# Patient Record
Sex: MALE | Race: WHITE
[De-identification: names, ages, dates, MRNs, and addresses within clinical notes are randomized per-mention and may not be internally consistent; named-entity substitution may affect disease eponyms.]

---

## 2017-03-04 ENCOUNTER — HOSPITAL ENCOUNTER (EMERGENCY)
Dept: HOSPITAL 84 - D.ER | Age: 27
Discharge: HOME | End: 2017-03-04
Payer: MEDICAID

## 2017-03-04 VITALS — BODY MASS INDEX: 23.7 KG/M2

## 2017-03-04 DIAGNOSIS — S50.11XA: Primary | ICD-10-CM
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
## 2017-03-04 DIAGNOSIS — F17.200: ICD-10-CM
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
## 2017-06-12 ENCOUNTER — HOSPITAL ENCOUNTER (EMERGENCY)
Dept: HOSPITAL 84 - D.ER | Age: 27
Discharge: HOME | End: 2017-06-12
Payer: MEDICAID

## 2017-06-12 VITALS — BODY MASS INDEX: 23.7 KG/M2

## 2017-06-12 DIAGNOSIS — K08.89: Primary | ICD-10-CM

## 2017-06-29 ENCOUNTER — HOSPITAL ENCOUNTER (EMERGENCY)
Dept: HOSPITAL 84 - D.ER | Age: 27
Discharge: HOME | End: 2017-06-29
Payer: MEDICAID

## 2017-06-29 VITALS — BODY MASS INDEX: 23.7 KG/M2

## 2017-06-29 DIAGNOSIS — L02.31: Primary | ICD-10-CM

## 2017-06-29 DIAGNOSIS — F17.200: ICD-10-CM

## 2017-10-25 ENCOUNTER — HOSPITAL ENCOUNTER (EMERGENCY)
Dept: HOSPITAL 84 - D.ER | Age: 27
Discharge: HOME | End: 2017-10-25
Payer: MEDICAID

## 2017-10-25 VITALS — BODY MASS INDEX: 23.7 KG/M2

## 2017-10-25 DIAGNOSIS — L03.115: Primary | ICD-10-CM

## 2017-10-25 DIAGNOSIS — F17.200: ICD-10-CM

## 2018-08-26 ENCOUNTER — HOSPITAL ENCOUNTER (EMERGENCY)
Dept: HOSPITAL 84 - D.ER | Age: 28
Discharge: HOME | End: 2018-08-26
Payer: MEDICAID

## 2018-08-26 VITALS — SYSTOLIC BLOOD PRESSURE: 151 MMHG | DIASTOLIC BLOOD PRESSURE: 87 MMHG

## 2018-08-26 VITALS — WEIGHT: 165.35 LBS | BODY MASS INDEX: 23.67 KG/M2 | HEIGHT: 70 IN

## 2018-08-26 DIAGNOSIS — F17.200: ICD-10-CM

## 2018-08-26 DIAGNOSIS — K04.7: Primary | ICD-10-CM

## 2020-08-17 ENCOUNTER — HOSPITAL ENCOUNTER (INPATIENT)
Dept: HOSPITAL 84 - D.ER | Age: 30
LOS: 2 days | Discharge: HOME | DRG: 390 | End: 2020-08-19
Attending: FAMILY MEDICINE | Admitting: FAMILY MEDICINE
Payer: MEDICAID

## 2020-08-17 VITALS
BODY MASS INDEX: 28.31 KG/M2 | HEIGHT: 67 IN | BODY MASS INDEX: 28.31 KG/M2 | BODY MASS INDEX: 28.31 KG/M2 | WEIGHT: 180.38 LBS | WEIGHT: 180.38 LBS | HEIGHT: 67 IN

## 2020-08-17 DIAGNOSIS — R10.9: ICD-10-CM

## 2020-08-17 DIAGNOSIS — K59.00: ICD-10-CM

## 2020-08-17 DIAGNOSIS — K56.609: Primary | ICD-10-CM

## 2020-08-17 DIAGNOSIS — F17.200: ICD-10-CM

## 2020-08-17 LAB
ALBUMIN SERPL-MCNC: 3.5 G/DL (ref 3.4–5)
ALP SERPL-CCNC: 82 U/L (ref 30–120)
ALT SERPL-CCNC: 21 U/L (ref 10–68)
AMYLASE SERPL-CCNC: 52 U/L (ref 25–115)
ANION GAP SERPL CALC-SCNC: 10.7 MMOL/L (ref 8–16)
BASOPHILS NFR BLD AUTO: 0.2 % (ref 0–2)
BILIRUB SERPL-MCNC: 0.26 MG/DL (ref 0.2–1.3)
BILIRUB SERPL-MCNC: NEGATIVE MG/DL
BUN SERPL-MCNC: 16 MG/DL (ref 7–18)
CALCIUM SERPL-MCNC: 8.4 MG/DL (ref 8.5–10.1)
CHLORIDE SERPL-SCNC: 101 MMOL/L (ref 98–107)
CO2 SERPL-SCNC: 29.3 MMOL/L (ref 21–32)
CREAT SERPL-MCNC: 1 MG/DL (ref 0.6–1.3)
EOSINOPHIL NFR BLD: 1.5 % (ref 0–7)
ERYTHROCYTE [DISTWIDTH] IN BLOOD BY AUTOMATED COUNT: 12.9 % (ref 11.5–14.5)
GLOBULIN SER-MCNC: 3.9 G/L
GLUCOSE SERPL-MCNC: 131 MG/DL (ref 74–106)
GLUCOSE SERPL-MCNC: NEGATIVE MG/DL
HCT VFR BLD CALC: 42.6 % (ref 42–54)
HGB BLD-MCNC: 15 G/DL (ref 13.5–17.5)
IMM GRANULOCYTES NFR BLD: 0.2 % (ref 0–5)
KETONES UR STRIP-MCNC: NEGATIVE MG/DL
LIPASE SERPL-CCNC: 73 U/L (ref 73–393)
LYMPHOCYTES NFR BLD AUTO: 16.1 % (ref 15–50)
MCH RBC QN AUTO: 30.3 PG (ref 26–34)
MCHC RBC AUTO-ENTMCNC: 35.2 G/DL (ref 31–37)
MCV RBC: 86.1 FL (ref 80–100)
MONOCYTES NFR BLD: 6.7 % (ref 2–11)
NEUTROPHILS NFR BLD AUTO: 75.3 % (ref 40–80)
NITRITE UR-MCNC: NEGATIVE MG/ML
OSMOLALITY SERPL CALC.SUM OF ELEC: 276 MOSM/KG (ref 275–300)
PH UR STRIP: 7 [PH] (ref 5–6)
PLATELET # BLD: 255 10X3/UL (ref 130–400)
PMV BLD AUTO: 9.2 FL (ref 7.4–10.4)
POTASSIUM SERPL-SCNC: 4 MMOL/L (ref 3.5–5.1)
PROT SERPL-MCNC: 7.4 G/DL (ref 6.4–8.2)
RBC # BLD AUTO: 4.95 10X6/UL (ref 4.2–6.1)
SODIUM SERPL-SCNC: 137 MMOL/L (ref 136–145)
UROBILINOGEN UR-MCNC: NORMAL MG/DL
WBC # BLD AUTO: 9.2 10X3/UL (ref 4.8–10.8)

## 2020-08-18 VITALS — DIASTOLIC BLOOD PRESSURE: 81 MMHG | SYSTOLIC BLOOD PRESSURE: 132 MMHG

## 2020-08-18 VITALS — DIASTOLIC BLOOD PRESSURE: 67 MMHG | SYSTOLIC BLOOD PRESSURE: 122 MMHG

## 2020-08-18 VITALS — DIASTOLIC BLOOD PRESSURE: 68 MMHG | SYSTOLIC BLOOD PRESSURE: 118 MMHG

## 2020-08-18 VITALS — SYSTOLIC BLOOD PRESSURE: 119 MMHG | DIASTOLIC BLOOD PRESSURE: 72 MMHG

## 2020-08-18 VITALS — SYSTOLIC BLOOD PRESSURE: 113 MMHG | DIASTOLIC BLOOD PRESSURE: 75 MMHG

## 2020-08-18 VITALS — DIASTOLIC BLOOD PRESSURE: 72 MMHG | SYSTOLIC BLOOD PRESSURE: 115 MMHG

## 2020-08-18 VITALS — DIASTOLIC BLOOD PRESSURE: 67 MMHG | SYSTOLIC BLOOD PRESSURE: 139 MMHG

## 2020-08-18 VITALS — DIASTOLIC BLOOD PRESSURE: 76 MMHG | SYSTOLIC BLOOD PRESSURE: 118 MMHG

## 2020-08-18 VITALS — DIASTOLIC BLOOD PRESSURE: 75 MMHG | SYSTOLIC BLOOD PRESSURE: 116 MMHG

## 2020-08-18 NOTE — NUR
A&O X4. S/O AT BEDSIDE. DENIES PAIN/DISCOMFORT. TOLERATING CLEAR LIQUIDS, NO
N/V. REQUESTS SMALL FULL LIQUID SNACK, CPOC.

## 2020-08-18 NOTE — NUR
PT ADMITTED TO ROOM VIA WHEELCHAIR AND ER STAFF, WIFE AT BEDSIDE, PT HERE FOR
ABDOMINAL PAIN, NG TUBE TO RIGHT NARE, HOOKED UP TO LOW INTERMITTENT SUCTION,
IV PATENT TO RIGHT HAND, SITE CLEAR, PT DENIES PAIN OR NEEDS AT THIS TIME,
ASSESSMENT COMPLETE, SR UP X2, CALL LIGHT IN REACH, WILL CONTINUE TO MONITOR

## 2020-08-19 VITALS — DIASTOLIC BLOOD PRESSURE: 71 MMHG | SYSTOLIC BLOOD PRESSURE: 121 MMHG

## 2020-08-19 VITALS — SYSTOLIC BLOOD PRESSURE: 114 MMHG | DIASTOLIC BLOOD PRESSURE: 72 MMHG

## 2020-08-19 VITALS — SYSTOLIC BLOOD PRESSURE: 126 MMHG | DIASTOLIC BLOOD PRESSURE: 69 MMHG

## 2020-08-19 LAB
ALBUMIN SERPL-MCNC: 3.2 G/DL (ref 3.4–5)
ALP SERPL-CCNC: 82 U/L (ref 30–120)
ALT SERPL-CCNC: 20 U/L (ref 10–68)
ANION GAP SERPL CALC-SCNC: 13 MMOL/L (ref 8–16)
APTT BLD: 37.4 SECONDS (ref 22.8–39.4)
BASOPHILS NFR BLD AUTO: 0.4 % (ref 0–2)
BILIRUB SERPL-MCNC: 0.28 MG/DL (ref 0.2–1.3)
BUN SERPL-MCNC: 11 MG/DL (ref 7–18)
CALCIUM SERPL-MCNC: 8.6 MG/DL (ref 8.5–10.1)
CHLORIDE SERPL-SCNC: 103 MMOL/L (ref 98–107)
CO2 SERPL-SCNC: 26.9 MMOL/L (ref 21–32)
CREAT SERPL-MCNC: 0.9 MG/DL (ref 0.6–1.3)
EOSINOPHIL NFR BLD: 3.3 % (ref 0–7)
ERYTHROCYTE [DISTWIDTH] IN BLOOD BY AUTOMATED COUNT: 13.1 % (ref 11.5–14.5)
GLOBULIN SER-MCNC: 3.9 G/L
GLUCOSE SERPL-MCNC: 127 MG/DL (ref 74–106)
HCT VFR BLD CALC: 42 % (ref 42–54)
HGB BLD-MCNC: 14.4 G/DL (ref 13.5–17.5)
IMM GRANULOCYTES NFR BLD: 0.1 % (ref 0–5)
INR PPP: 1.02 (ref 0.85–1.17)
LYMPHOCYTES NFR BLD AUTO: 29 % (ref 15–50)
MCH RBC QN AUTO: 29.9 PG (ref 26–34)
MCHC RBC AUTO-ENTMCNC: 34.3 G/DL (ref 31–37)
MCV RBC: 87.1 FL (ref 80–100)
MONOCYTES NFR BLD: 8.1 % (ref 2–11)
NEUTROPHILS NFR BLD AUTO: 59.1 % (ref 40–80)
OSMOLALITY SERPL CALC.SUM OF ELEC: 278 MOSM/KG (ref 275–300)
PLATELET # BLD: 263 10X3/UL (ref 130–400)
PMV BLD AUTO: 9.3 FL (ref 7.4–10.4)
POTASSIUM SERPL-SCNC: 3.9 MMOL/L (ref 3.5–5.1)
PROT SERPL-MCNC: 7.1 G/DL (ref 6.4–8.2)
PROTHROMBIN TIME: 13.4 SECONDS (ref 11.6–15)
RBC # BLD AUTO: 4.82 10X6/UL (ref 4.2–6.1)
SODIUM SERPL-SCNC: 139 MMOL/L (ref 136–145)
WBC # BLD AUTO: 6.7 10X3/UL (ref 4.8–10.8)

## 2020-08-19 NOTE — NUR
REMOVED PT IV FROM RIGHT HAND, CATHETER TIP INTACT, COVERED WITH GAUZE AND
TAPE, TOLERATED WELL. PT SIGNED ALL NECESSARY DISCHARGE PARPERWORK. BEING
ESCORTED OUT BY FAMILY. DENIES ANY NEEDS OR HELP.

## 2020-08-19 NOTE — NUR
REPORTS STARVATION. TOLERATING ICE CREAM, SHERBERT AND PUDDING WELL. NO
N/V/DISCOMFORT. PASSING GAS. CTM.